# Patient Record
Sex: FEMALE | Race: WHITE | NOT HISPANIC OR LATINO | URBAN - METROPOLITAN AREA
[De-identification: names, ages, dates, MRNs, and addresses within clinical notes are randomized per-mention and may not be internally consistent; named-entity substitution may affect disease eponyms.]

---

## 2024-03-29 ENCOUNTER — APPOINTMENT (RX ONLY)
Dept: URBAN - METROPOLITAN AREA CLINIC 161 | Facility: CLINIC | Age: 39
Setting detail: DERMATOLOGY
End: 2024-03-29

## 2024-03-29 DIAGNOSIS — L70.0 ACNE VULGARIS: ICD-10-CM

## 2024-03-29 DIAGNOSIS — L71.0 PERIORAL DERMATITIS: ICD-10-CM | Status: WORSENING

## 2024-03-29 PROCEDURE — 99214 OFFICE O/P EST MOD 30 MIN: CPT

## 2024-03-29 PROCEDURE — ? PRESCRIPTION

## 2024-03-29 PROCEDURE — ? DIAGNOSIS COMMENT

## 2024-03-29 PROCEDURE — ? PRESCRIPTION MEDICATION MANAGEMENT

## 2024-03-29 PROCEDURE — ? COUNSELING

## 2024-03-29 RX ORDER — METRONIDAZOLE 7.5 MG/G
GEL TOPICAL
Qty: 45 | Refills: 5 | Status: ERX | COMMUNITY
Start: 2024-03-29

## 2024-03-29 RX ORDER — DOXYCYCLINE HYCLATE 100 MG/1
TABLET, COATED ORAL
Qty: 42 | Refills: 0 | Status: ERX | COMMUNITY
Start: 2024-03-29

## 2024-03-29 RX ORDER — FLUCONAZOLE 100 MG/1
TABLET ORAL
Qty: 2 | Refills: 1 | Status: ERX | COMMUNITY
Start: 2024-03-29

## 2024-03-29 RX ORDER — SPIRONOLACTONE 50 MG/1
TABLET, FILM COATED ORAL
Qty: 90 | Refills: 0 | Status: ERX | COMMUNITY
Start: 2024-03-29

## 2024-03-29 RX ADMIN — FLUCONAZOLE: 100 TABLET ORAL at 00:00

## 2024-03-29 RX ADMIN — SPIRONOLACTONE: 50 TABLET, FILM COATED ORAL at 00:00

## 2024-03-29 RX ADMIN — METRONIDAZOLE: 7.5 GEL TOPICAL at 00:00

## 2024-03-29 RX ADMIN — DOXYCYCLINE HYCLATE: 100 TABLET, COATED ORAL at 00:00

## 2024-03-29 ASSESSMENT — LOCATION SIMPLE DESCRIPTION DERM: LOCATION SIMPLE: CHIN

## 2024-03-29 ASSESSMENT — LOCATION ZONE DERM: LOCATION ZONE: FACE

## 2024-03-29 ASSESSMENT — LOCATION DETAILED DESCRIPTION DERM: LOCATION DETAILED: RIGHT CHIN

## 2024-03-29 NOTE — PROCEDURE: PRESCRIPTION MEDICATION MANAGEMENT
Detail Level: Zone
Render In Strict Bullet Format?: No
Modify Regimen: increase dose of Spironolactone 25 mg to 50 mg qd, discussed increasing to  mg qd in several months if acne still not at therapeutic target

## 2024-03-29 NOTE — PROCEDURE: DIAGNOSIS COMMENT
Render Risk Assessment In Note?: no
Detail Level: Simple
Comment: d/c additional skincare products around mouth and nose.  Sample of Opzelura given if no improvement with Metrogel.